# Patient Record
Sex: MALE | Race: WHITE | URBAN - METROPOLITAN AREA
[De-identification: names, ages, dates, MRNs, and addresses within clinical notes are randomized per-mention and may not be internally consistent; named-entity substitution may affect disease eponyms.]

---

## 2018-08-16 ENCOUNTER — RECORDS - HEALTHEAST (OUTPATIENT)
Dept: LAB | Facility: CLINIC | Age: 38
End: 2018-08-16

## 2018-08-16 LAB
CHOLEST SERPL-MCNC: 213 MG/DL
FASTING STATUS PATIENT QL REPORTED: ABNORMAL
HDLC SERPL-MCNC: 44 MG/DL
LDLC SERPL CALC-MCNC: 150 MG/DL
TRIGL SERPL-MCNC: 96 MG/DL

## 2018-10-09 ENCOUNTER — RECORDS - HEALTHEAST (OUTPATIENT)
Dept: LAB | Facility: CLINIC | Age: 38
End: 2018-10-09

## 2018-10-11 LAB — BACTERIA SPEC CULT: NORMAL

## 2019-09-11 ENCOUNTER — RECORDS - HEALTHEAST (OUTPATIENT)
Dept: LAB | Facility: CLINIC | Age: 39
End: 2019-09-11

## 2019-09-11 LAB
CHOLEST SERPL-MCNC: 217 MG/DL
FASTING STATUS PATIENT QL REPORTED: ABNORMAL
HDLC SERPL-MCNC: 50 MG/DL
LDLC SERPL CALC-MCNC: 141 MG/DL
TRIGL SERPL-MCNC: 131 MG/DL

## 2021-01-13 ENCOUNTER — RECORDS - HEALTHEAST (OUTPATIENT)
Dept: LAB | Facility: CLINIC | Age: 41
End: 2021-01-13

## 2021-01-13 LAB
ANION GAP SERPL CALCULATED.3IONS-SCNC: 13 MMOL/L (ref 5–18)
BUN SERPL-MCNC: 20 MG/DL (ref 8–22)
CALCIUM SERPL-MCNC: 9.3 MG/DL (ref 8.5–10.5)
CHLORIDE BLD-SCNC: 105 MMOL/L (ref 98–107)
CHOLEST SERPL-MCNC: 215 MG/DL
CO2 SERPL-SCNC: 21 MMOL/L (ref 22–31)
CREAT SERPL-MCNC: 1.15 MG/DL (ref 0.7–1.3)
FASTING STATUS PATIENT QL REPORTED: ABNORMAL
GFR SERPL CREATININE-BSD FRML MDRD: >60 ML/MIN/1.73M2
GLUCOSE BLD-MCNC: 106 MG/DL (ref 70–125)
HDLC SERPL-MCNC: 49 MG/DL
LDLC SERPL CALC-MCNC: 108 MG/DL
POTASSIUM BLD-SCNC: 3.9 MMOL/L (ref 3.5–5)
SODIUM SERPL-SCNC: 139 MMOL/L (ref 136–145)
TRIGL SERPL-MCNC: 288 MG/DL
TSH SERPL DL<=0.005 MIU/L-ACNC: 1.22 UIU/ML (ref 0.3–5)

## 2022-03-08 ENCOUNTER — OFFICE VISIT (OUTPATIENT)
Dept: PHARMACY | Facility: PHYSICIAN GROUP | Age: 42
End: 2022-03-08
Payer: COMMERCIAL

## 2022-03-08 DIAGNOSIS — F41.8 ANXIETY WITH DEPRESSION: Primary | ICD-10-CM

## 2022-03-08 DIAGNOSIS — F90.2 ATTENTION DEFICIT HYPERACTIVITY DISORDER (ADHD), COMBINED TYPE: ICD-10-CM

## 2022-03-08 PROCEDURE — 99607 MTMS BY PHARM ADDL 15 MIN: CPT | Performed by: PHARMACIST

## 2022-03-08 PROCEDURE — 99605 MTMS BY PHARM NP 15 MIN: CPT | Performed by: PHARMACIST

## 2022-03-08 NOTE — PROGRESS NOTES
Medication Therapy Management (MTM) Encounter    ASSESSMENT:                            Medication Adherence/Access: No issues identified    Anxiety: Based on currently symptoms, he may benefit from trying alternative medication for anxiety. He may benefit from switching to SNRI or even bupropion to see if that helps with both anxiety and ADHD. Appropriate to do pharmacogenetic testing based on patient's request to help guide medication selection.     ADHD: Based on responses from questionnaire he does meet criteria for ADHD diagnosis. Atomoxetine dose used was likely too low, recommended dosing for ADHD in adults is 40 mg/day, increased to target dose of 80 mg/day in 1-4 weeks. Adverse effects with stimulant trial but may be able to tolerate a different stimulant better. Or as noted above may benefit from trial of bupropion. Reasonable to do pharmacogenetic testing to help guide medication selection.     PLAN:                            1. Cheek swab collected and [facility] to [facility].  2. Continue to take all of your current medications until we have your results and come up with a plan with your doctor.     3. Once your results are back I will call you or send portal message to let you know. Then schedule a follow-up visit in clinic with Dr. Saxena on a Tuesday for a co-visit to review results.     Follow-up: 2-3 weeks when [employer] results are back.     SUBJECTIVE/OBJECTIVE:                          Shayan Hill is a 42 year old male coming in for a co-visit with Dr. Saxena.     Reason for visit: Anxiety and ADHD medication management. Discuss pharmacogenetic testing.    Allergies/ADRs: Reviewed in chart  Past Medical History: Reviewed in chart  Tobacco: He reports that he has never smoked. He has never used smokeless tobacco.  Alcohol: 1 beer per day     Medication Adherence/Access: no issues reported    Anxiety:   Current medication:   citalopram 20 mg once daily   hydroxyzine pamoate 50 mg  "every 6 hours as needed- not taking  He has struggled with anxiety for a long time. He has a hard time knowing if his current difficulties are more related to ADHD or anxiety. He has been taking citalopram since he was really young. He does think citalopram has helped him. He hasn't had any side effects from it. He tried hydroxyzine for increased anxiety but that didn't really do anything for him.     ADHD:   Current medication: atomoxetine 25 mg once daily  He recently was started on atomoxetine for ADHD symptoms after he had a bad experience with Adderall. He was initially started on Adderall around May of 2021. When he initially started it thought it was really helpful for his ADHD symptoms but after using for awhile he realized it was making him more on edge, not himself, and eventually caused him to feel depressed. His wife had noticed the differences as well. He stopped this in January this year and these symptoms stopped. He was taking 20 mg Adderall XR every day and 5 mg immediate-release tablet in afternoon as needed. Tried using non-stimulant atomoxetine to see if this helped without causing the adverse effects but he didn't notice any improvement with taking it. Unclear how long he had been using it for. ADHD questionnaire was done by provider during visit.     Today's Vitals: /64 (BP Location: Right arm, Patient Position: Sitting, Cuff Size: Adult Regular)   Pulse 66   Ht 5' 10.5\" (1.791 m)   Wt 177 lb (80.3 kg)   BMI 25.04 kg/m    ----------------    I spent 20 minutes with this patient today. All changes were made via collaborative practice agreement with No primary care provider on file.. A copy of the visit note was provided to the patient's provider(s).    The patient was given a summary of these recommendations. See Provider note/AVS from today.     Laly Grubbs, PharmD, BCACP  Medication Therapy Management Pharmacist  [facility]  Pager: [phone]       Medication Therapy " Recommendations  Anxiety with depression    Current Medication: citalopram (CELEXA) 20 MG tablet   Rationale: Condition refractory to medication - Ineffective medication - Effectiveness   Recommendation: Order Lab   Status: Accepted per CPA   Note: Pharmacogenetic testing

## 2022-03-11 VITALS
DIASTOLIC BLOOD PRESSURE: 64 MMHG | HEIGHT: 71 IN | BODY MASS INDEX: 24.78 KG/M2 | WEIGHT: 177 LBS | SYSTOLIC BLOOD PRESSURE: 118 MMHG | HEART RATE: 66 BPM

## 2022-03-11 RX ORDER — HYDROXYZINE PAMOATE 50 MG/1
50 CAPSULE ORAL EVERY 6 HOURS PRN
COMMUNITY
End: 2022-03-29

## 2022-03-11 RX ORDER — CITALOPRAM HYDROBROMIDE 20 MG/1
20 TABLET ORAL DAILY
COMMUNITY

## 2022-03-11 RX ORDER — ATOMOXETINE 25 MG/1
25 CAPSULE ORAL DAILY
COMMUNITY
End: 2022-03-29

## 2022-03-11 NOTE — PATIENT INSTRUCTIONS
Recommendations from today's MTM visit:                                                       1. Cheek swab collected and sample sent to [facility] for processing.  2. Continue to take all of your current medications until we have your results and come up with a plan with your doctor.     3. Once your results are back I will call you or send portal message to let you know. Then schedule a follow-up visit in clinic with Dr. Saxena on a Tuesday for a co-visit to review results.     Follow-up: 2-3 weeks when [facility] results are back.     It was great to speak with you today.  I value your experience and would be very thankful for your time with providing feedback on our clinic survey. You may receive a survey via email or text message in the next few days.     To schedule another MTM appointment, please call the [facility] scheduling line at [phone].    My Clinical Pharmacist's contact information:                                                      Please feel free to contact me with any questions or concerns you have.      Laly Grubbs, Tammy, BCACP  Medication Therapy Management Pharmacist  [facility]  Pager: [phone]

## 2022-03-29 ENCOUNTER — OFFICE VISIT (OUTPATIENT)
Dept: PHARMACY | Facility: PHYSICIAN GROUP | Age: 42
End: 2022-03-29
Payer: COMMERCIAL

## 2022-03-29 DIAGNOSIS — F41.8 ANXIETY WITH DEPRESSION: Primary | ICD-10-CM

## 2022-03-29 DIAGNOSIS — D68.51 FACTOR 5 LEIDEN MUTATION, HETEROZYGOUS (H): ICD-10-CM

## 2022-03-29 DIAGNOSIS — F90.2 ATTENTION DEFICIT HYPERACTIVITY DISORDER (ADHD), COMBINED TYPE: ICD-10-CM

## 2022-03-29 DIAGNOSIS — Z13.79 ENCOUNTER FOR PHARMACOGENETIC TESTING: ICD-10-CM

## 2022-03-29 PROCEDURE — 99606 MTMS BY PHARM EST 15 MIN: CPT | Performed by: PHARMACIST

## 2022-03-29 RX ORDER — BUPROPION HYDROCHLORIDE 150 MG/1
150 TABLET ORAL EVERY MORNING
COMMUNITY

## 2022-03-29 NOTE — PROGRESS NOTES
Medication Therapy Management (MTM) Encounter    ASSESSMENT:                            Medication Adherence/Access: No issues identified    Pharmacogenetic results: Reviewed [employer] results with patient.  Discussed that results will not tell us which drugs will work but can give us some insight into dosing and side effects based on individual metabolism of each medication.   Given current and previous therapy, notable results include:   - CYP2D6 Intermediate Metabolizer- Decreased activity. Drugs converted to active metabolite(s) may have reduced exposure. Active drugs converted to inactive metabolite(s) may have increased exposure.  -CYP2B6 Intermediate Metabolizer- Decreased activity. Drugs converted to active metabolite(s) may have reduced exposure. Active drugs converted to inactive metabolite(s) may have increased exposure.  -CY Rapid Metabolizer- Increased activity. Drugs converted to active metabolite(s) may have increased exposure. Active drugs converted to inactive metabolite(s) may have decreased exposure.    -MTHFR- Significantly reduced folic acid conversion to active methylfolate based on MTHFR genotype. Active L-methylfolate plays a role in neurotransmitter synthesis. Studies have shown that L-methylfolate supplement alone or in addition to an antidepressant can help reduce depressive symptoms. One study has shown that depressed patients with variation in MTHFR may benefit more from taking L-methylfolate. L-methylfolate doses used in the studies varied, but 15 mg once daily was most common.     -Factor 5 ([id number] GA)- Increased Risk- Increased risk of thrombosis associated with Factor V Leiden thrombophilia. Other genetic and clinical factors largely contribute to the risk for thrombosis.  -Factor 2 ([id number] GA)- Increased Risk- Increased risk of thrombosis associated with prothrombin thrombophilia. Other genetic and clinical factors largely contribute to the risk for thrombosis.    Anxiety: Based  on current symptoms and pharmacogenetic test results, he may benefit from adding bupropion to help increase dopamine and may also help with ADHD symptoms. He is an intermediate metabolizer for CYP2B6 so starting at usual bupropion dose is appropriate and monitor for side effects.     ADHD: As noted above bupropion may help with both mood symptoms and ADHD. Reasonable to try this before considering another stimulant trial.     PLAN:                            1. Start bupropion Xl 150 mg once daily in morning  2. Continue citalopram 20 mg once daily    Follow-up: 4 weeks with Dr. Saxena    SUBJECTIVE/OBJECTIVE:                          Shayan Hill is a 42 year old male coming in for a follow-up visit. Today's visit is a co-visit with Dr. Saxena. Today's visit is a follow-up MT visit from 3/8/2022     Reason for visit: Anxiety and ADHD medication management. Review results of pharmacogenetic test.    Allergies/ADRs: Reviewed in chart  Past Medical History: Reviewed in chart  Tobacco: He reports that he has never smoked. He has never used smokeless tobacco.  Alcohol: 1 beer per day     Medication Adherence/Access: no issues reported    Pharmacogenetic Results:  [employer] testing conducted and reported on 3/15/2022.     [facility] Results: (see PDF report in chart documents for more detailed results)    Pharmacokinetic Gene Variants     Gene Phenotype Notable Medications Impacted   CY Rapid Metabolizer    CYP2B6 Intermediate Metabolizer bupropion   CYP2D6 Intermediate Metabolizer Venlafaxine, fluoxetine, fluvoxamine, paroxetine, amitriptyline, vortioxetine       Pharmacodynamic Gene Variants     Gene Phenotype Notable Medications Impacted   MTHFR Significantly Reduced Conversion    F5 [id number] GA Increased Risk    F2 [id number] GA Increased Risk            Anxiety:   Current medications:   citalopram 20 mg once daily  hydroxyzine 25 mg as needed   He has struggled with anxiety for a long time. He has a hard time  knowing if his current difficulties are more related to ADHD or anxiety. He has been taking citalopram since he was really young. He does think citalopram has helped him. He hasn't had any side effects from it. He has not been using hydroxyzine.     ADHD:   Current medication: None  He has not been on medication for this since stopping atomoxetine. He was only on 25 mg atomoxetine and didn't feel like it did anything for him. He had previously been on Adderall. When he initially started it thought it was really helpful for his ADHD symptoms but after using for awhile he realized it was making him more on edge, not himself, and eventually caused him to feel depressed. His wife had noticed the differences as well. He stopped this in January this year and these symptoms resolved. He does think stimulant was helpful but is worried about the negative side effects coming back. He would like to improve anxiety and mood first.        Today's Vitals: There were no vitals taken for this visit.  ----------------      I spent 15 minutes with this patient today. Dr. Saxena was provided the recommendations above  in clinic today and Dr. Saxena is the authorizing prescriber for this visit through the pharmacist collaborative practice agreement.. A copy of the visit note was provided to the patient's provider(s).    The patient was given a summary of these recommendations.     Laly Grubbs, PharmD, [facility]  Medication Therapy Management Pharmacist  [facility]  Pager: [phone]       Medication Therapy Recommendations  Anxiety with depression    Rationale: Synergistic therapy - Needs additional medication therapy - Indication   Recommendation: Start Medication - buPROPion 150 MG 24 hr tablet   Status: Accepted per Provider

## 2025-06-06 ENCOUNTER — TELEPHONE (OUTPATIENT)
Dept: SLEEP MEDICINE | Facility: CLINIC | Age: 45
End: 2025-06-06

## 2025-06-06 NOTE — TELEPHONE ENCOUNTER
Patient needs to be rescheduled for their virtual visit due to Reason for Reschedule: Patient Request    Appointment mode: Video  Provider: Jorden Lanza MD